# Patient Record
Sex: MALE | Race: WHITE | NOT HISPANIC OR LATINO | Employment: FULL TIME | URBAN - METROPOLITAN AREA
[De-identification: names, ages, dates, MRNs, and addresses within clinical notes are randomized per-mention and may not be internally consistent; named-entity substitution may affect disease eponyms.]

---

## 2019-10-16 ENCOUNTER — APPOINTMENT (OUTPATIENT)
Dept: RADIOLOGY | Facility: MEDICAL CENTER | Age: 49
End: 2019-10-16

## 2019-10-16 VITALS
SYSTOLIC BLOOD PRESSURE: 141 MMHG | HEIGHT: 68 IN | DIASTOLIC BLOOD PRESSURE: 80 MMHG | BODY MASS INDEX: 24.25 KG/M2 | WEIGHT: 160 LBS

## 2019-10-16 DIAGNOSIS — M54.31 SCIATICA OF RIGHT SIDE: ICD-10-CM

## 2019-10-16 DIAGNOSIS — M54.31 SCIATICA OF RIGHT SIDE: Primary | ICD-10-CM

## 2019-10-16 DIAGNOSIS — M25.551 PAIN IN RIGHT HIP: ICD-10-CM

## 2019-10-16 PROCEDURE — 73502 X-RAY EXAM HIP UNI 2-3 VIEWS: CPT

## 2019-10-16 PROCEDURE — 99203 OFFICE O/P NEW LOW 30 MIN: CPT | Performed by: ORTHOPAEDIC SURGERY

## 2019-10-16 PROCEDURE — 72100 X-RAY EXAM L-S SPINE 2/3 VWS: CPT

## 2019-10-16 RX ORDER — IBUPROFEN 600 MG/1
TABLET ORAL EVERY 6 HOURS PRN
COMMUNITY

## 2019-10-16 RX ORDER — METHYLPREDNISOLONE 4 MG/1
TABLET ORAL
Qty: 1 EACH | Refills: 0 | Status: SHIPPED | OUTPATIENT
Start: 2019-10-16

## 2019-10-16 NOTE — PROGRESS NOTES
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 his ability to work and he was not able to go to work today  He also reports that in addition to the motrin he has used alcohol to self-medicate for this problem  He admits that he has drank approximately 6 beers prior to coming in to the office today  ALLERGIES:  No Known Allergies    CURRENT MEDICATIONS:  Current Outpatient Medications   Medication Sig Dispense Refill    ibuprofen (MOTRIN) 600 mg tablet Take by mouth every 6 (six) hours as needed for mild pain      methylPREDNISolone 4 MG tablet therapy pack Use as directed on package 1 each 0     No current facility-administered medications for this visit  PAST MEDICAL HISTORY  History reviewed  No pertinent past medical history  SURGICAL HISTORY  History reviewed  No pertinent surgical history  FAMILY HISTORY  History reviewed  No pertinent family history      SOCIAL HISTORY  Social History     Socioeconomic History    Marital status: Single     Spouse name: Not on file    Number of children: Not on file    Years of education: Not on file    Highest education level: Not on file   Occupational History    Not on file   Social Needs    Financial resource strain: Not on file    Food insecurity:     Worry: Not on file     Inability: Not on file    Transportation needs:     Medical: Not on file     Non-medical: Not on file   Tobacco Use    Smoking status: Current Some Day Smoker    Smokeless tobacco: Never Used   Substance and Sexual Activity    Alcohol use: Not on file    Drug use: Not on file    Sexual activity: Not on file   Lifestyle    Physical activity:     Days per week: Not on file     Minutes per session: Not on file    Stress: Not on file   Relationships    Social connections:     Talks on phone: Not on file     Gets together: Not on file     Attends Islam service: Not on file     Active member of club or organization: Not on file     Attends meetings of clubs or organizations: Not on file     Relationship status: Not on file    Intimate partner violence:     Fear of current or ex partner: Not on file     Emotionally abused: Not on file     Physically abused: Not on file     Forced sexual activity: Not on file   Other Topics Concern    Not on file   Social History Narrative    Not on file         Review of Systems   Positive for weight loss, heartburn, back pain, muscle pain  Otherwise negative  Physical Exam    Vitals  Vitals:    10/16/19 1255   BP: 141/80       BMI  Body mass index is 24 33 kg/m²  Cardio: regular rate  Lungs: no increased work of breathing  Abdomen: non-distended  right Hip Exam  Extension: 10  Flexion: 120  Internal/External Rotation: 30/30  Leg is noted to be similar in length to other leg   No pain with logroll  LUMBAR SPINE   Heel to toe walk performed, although balance issues (patient admits to drinking 6+ beers prior to appointment)  Sensation intact of the lower extremities  Trendelenburg sign negative  Skin intact     MOTOR STRENGTH   RT LT   Iliopsoas 5/5 5/5   Quadricep 5/5 5/5   Anterior tibialis 5/5 5/5   Extensor hallucis longus 5/5 5/5   Gastrocsoleus 5/5 5/5   DEEP TENDON REFLEXES   RT LT   Patellar 1+ 1+   STRAIGHT LEG RAISING   RT LT   Sitting @ 90 degrees Neg Neg      Imaging  A) Imaging modality available  Radiographs: yes  MRI scan: no  CT scan: no    B) Imaging findings  RIGHT HIP  Subchondral cysts: no  Subchondral sclerosis: yes  Periarticular osteophytes: no  Joint subluxation: no  Joint space narrowing: no  Bone-on-bone articulations: no  Avascular necrosis: no  Femoral head appears undercovered, consistent with DDH  Lumbar spine films obtained  No obvious fracture  Disc space collapsed at L5-S1 with osteophyte formation noted  No significant listhesis noted  Assessment and Plan  L5-S1 degenerative disc disease with radicular pain  - counseled patient and girlfriend regarding diagnosis, including that symptoms often improve with time and non-operative mgmt including NSAIDs and medrol dose pack and physical therapy  - provided script for medrol dose pack and advised that short course of NSAIDs would be okay (1-2 weeks of naproxen 220mg q12 hours)  Advised that should he take prolonged NSAIDs he would need to have this monitored with his PCP  - will prescribe PT, however, patient relates that he does not have insurance and does not have money to pay for treatment  - will also provide referral for spine and pain mgmt if his pain continues despite the above interventions       No significant signs of hip arthritis or symptomatology related to the hip or labrum    I have spent 30 minutes with Patient and family today in which greater than 50% of this time was spent in counseling/coordination of care regarding Diagnostic results, Prognosis, Risks and benefits of tx options, Intructions for management and Patient and family education  Jolly Fam MD  Adult Reconstruction Surgery  Department of [place] [id number]  1:59 PM